# Patient Record
Sex: MALE | Race: WHITE | NOT HISPANIC OR LATINO | URBAN - METROPOLITAN AREA
[De-identification: names, ages, dates, MRNs, and addresses within clinical notes are randomized per-mention and may not be internally consistent; named-entity substitution may affect disease eponyms.]

---

## 2022-02-23 ENCOUNTER — APPOINTMENT (OUTPATIENT)
Dept: URBAN - METROPOLITAN AREA CLINIC 217 | Age: 67
Setting detail: DERMATOLOGY
End: 2022-02-23

## 2022-02-23 DIAGNOSIS — L29.8 OTHER PRURITUS: ICD-10-CM

## 2022-02-23 PROCEDURE — OTHER TREATMENT REGIMEN: OTHER

## 2022-02-23 PROCEDURE — OTHER DIAGNOSIS COMMENT: OTHER

## 2022-02-23 PROCEDURE — 99203 OFFICE O/P NEW LOW 30 MIN: CPT

## 2022-02-23 PROCEDURE — OTHER COUNSELING: OTHER

## 2022-02-23 PROCEDURE — OTHER MIPS QUALITY: OTHER

## 2022-02-23 NOTE — PROCEDURE: MIPS QUALITY
Quality 111:Pneumonia Vaccination Status For Older Adults: Pneumococcal Vaccination Administered
Quality 110: Preventive Care And Screening: Influenza Immunization: Influenza Immunization Administered during Influenza season
Detail Level: Detailed
Quality 130: Documentation Of Current Medications In The Medical Record: Current Medications Documented

## 2022-02-23 NOTE — HPI: RASH
How Severe Is Your Rash?: moderate
Is This A New Presentation, Or A Follow-Up?: Rash
Additional History: Patient was screened before evaluation for COVID-19 by inquiring about fever, shortness of breath, weakness, fatigue, loss of taste or smell, and gastrointestinal symptoms.  Patient denied any of the above. \\n\\nNote: the new paper “History and Intake” form was reviewed at time of visit, but the data was not entered yet into [facility]

## 2022-02-23 NOTE — PROCEDURE: TREATMENT REGIMEN
Plan: Advised against long, hot showers. Encouraged to use all products that are fragrance free. Ice can be used for symptomatic relief. Discussed option of lab screening to r/o systemic causes, however, pt states he’s had routine lab work by PCP recently which was WNL.
Otc Regimen: Allegra 180 mg PO daily x2-3 weeks then PRN\\nSarna lotion PRN\\nMoisturize ad artur
Detail Level: Zone

## 2022-02-23 NOTE — PROCEDURE: DIAGNOSIS COMMENT
Comment: Possible correlation to exposures at work (works at [employer]); feels onset of pruritus started when he began work here a few years ago
Detail Level: Simple
Render Risk Assessment In Note?: no

## 2024-05-09 ENCOUNTER — APPOINTMENT (OUTPATIENT)
Dept: URBAN - METROPOLITAN AREA CLINIC 217 | Age: 69
Setting detail: DERMATOLOGY
End: 2024-05-09

## 2024-05-09 DIAGNOSIS — L28.0 LICHEN SIMPLEX CHRONICUS: ICD-10-CM

## 2024-05-09 PROCEDURE — OTHER MIPS QUALITY: OTHER

## 2024-05-09 PROCEDURE — OTHER PRESCRIPTION MEDICATION MANAGEMENT: OTHER

## 2024-05-09 PROCEDURE — 99214 OFFICE O/P EST MOD 30 MIN: CPT

## 2024-05-09 PROCEDURE — OTHER PRESCRIPTION: OTHER

## 2024-05-09 PROCEDURE — OTHER COUNSELING: OTHER

## 2024-05-09 RX ORDER — MOMETASONE FUROATE 1 MG/G
CREAM TOPICAL
Qty: 45 | Refills: 3 | Status: ERX | COMMUNITY
Start: 2024-05-09

## 2024-05-09 ASSESSMENT — LOCATION SIMPLE DESCRIPTION DERM
LOCATION SIMPLE: RIGHT ANKLE
LOCATION SIMPLE: RIGHT ANKLE

## 2024-05-09 ASSESSMENT — SEVERITY ASSESSMENT: SEVERITY: MILD

## 2024-05-09 ASSESSMENT — BSA RASH: BSA RASH: 1

## 2024-05-09 ASSESSMENT — LOCATION ZONE DERM
LOCATION ZONE: LEG
LOCATION ZONE: LEG

## 2024-05-09 ASSESSMENT — LOCATION DETAILED DESCRIPTION DERM
LOCATION DETAILED: RIGHT LATERAL POSTERIOR ANKLE
LOCATION DETAILED: RIGHT LATERAL POSTERIOR ANKLE

## 2024-05-09 NOTE — PROCEDURE: PRESCRIPTION MEDICATION MANAGEMENT
Render In Strict Bullet Format?: No
Initiate Treatment: Mometasone qd
Plan: Discussed dx and treatment options. Discussed avoiding manipulation and itch scratch cycle. Patient and I mutually agree to topical steroid.
Detail Level: Zone

## 2024-05-09 NOTE — HPI: OTHER
Condition:: Patient has a continuous itch with out a rash noted bit with excoriated areas on the later right ankle . He states that calimine lotion works the best and he was just diagnosed with diabetes.  He states he is on a systemic treatment for toenail fungus . Her for evaluation
Please Describe Your Condition:: Itch bilateral feet and ankles as well spot on the the back with no rash seen.